# Patient Record
Sex: MALE | URBAN - METROPOLITAN AREA
[De-identification: names, ages, dates, MRNs, and addresses within clinical notes are randomized per-mention and may not be internally consistent; named-entity substitution may affect disease eponyms.]

---

## 2017-12-07 ENCOUNTER — COMMUNICATION - HEALTHEAST (OUTPATIENT)
Dept: SCHEDULING | Facility: CLINIC | Age: 22
End: 2017-12-07

## 2017-12-07 ENCOUNTER — OFFICE VISIT - HEALTHEAST (OUTPATIENT)
Dept: FAMILY MEDICINE | Facility: CLINIC | Age: 22
End: 2017-12-07

## 2017-12-07 DIAGNOSIS — F90.9 ADHD: ICD-10-CM

## 2017-12-07 DIAGNOSIS — R13.10 DYSPHAGIA: ICD-10-CM

## 2017-12-07 DIAGNOSIS — J45.40 MODERATE PERSISTENT ASTHMA: ICD-10-CM

## 2017-12-07 DIAGNOSIS — F32.A DEPRESSION: ICD-10-CM

## 2017-12-07 DIAGNOSIS — K21.00 GERD WITH ESOPHAGITIS: ICD-10-CM

## 2017-12-07 RX ORDER — ALBUTEROL SULFATE 0.83 MG/ML
2.5 SOLUTION RESPIRATORY (INHALATION) 3 TIMES DAILY
Status: SHIPPED | COMMUNITY
Start: 2017-12-07

## 2017-12-07 RX ORDER — LANSOPRAZOLE 30 MG/1
30 CAPSULE, DELAYED RELEASE ORAL DAILY
Status: SHIPPED | COMMUNITY
Start: 2017-12-07

## 2017-12-07 RX ORDER — ALBUTEROL SULFATE 5 MG/ML
2.5 SOLUTION RESPIRATORY (INHALATION) 3 TIMES DAILY
Status: SHIPPED | COMMUNITY
Start: 2017-12-07

## 2017-12-07 RX ORDER — ALBUTEROL SULFATE 90 UG/1
2 AEROSOL, METERED RESPIRATORY (INHALATION) EVERY 6 HOURS PRN
Status: SHIPPED | COMMUNITY
Start: 2017-12-07

## 2017-12-07 ASSESSMENT — MIFFLIN-ST. JEOR: SCORE: 1632.45

## 2017-12-08 ENCOUNTER — COMMUNICATION - HEALTHEAST (OUTPATIENT)
Dept: FAMILY MEDICINE | Facility: CLINIC | Age: 22
End: 2017-12-08

## 2017-12-08 ENCOUNTER — AMBULATORY - HEALTHEAST (OUTPATIENT)
Dept: FAMILY MEDICINE | Facility: CLINIC | Age: 22
End: 2017-12-08

## 2017-12-08 RX ORDER — BUDESONIDE 1 MG/2ML
INHALANT ORAL
Qty: 120 ML | Refills: 3 | Status: SHIPPED | OUTPATIENT
Start: 2017-12-08

## 2017-12-20 ENCOUNTER — COMMUNICATION - HEALTHEAST (OUTPATIENT)
Dept: FAMILY MEDICINE | Facility: CLINIC | Age: 22
End: 2017-12-20

## 2017-12-20 DIAGNOSIS — K21.00 GERD WITH ESOPHAGITIS: ICD-10-CM

## 2017-12-20 RX ORDER — OMEPRAZOLE
20 KIT
Qty: 450 ML | Refills: 0 | Status: SHIPPED | OUTPATIENT
Start: 2017-12-20

## 2017-12-22 ENCOUNTER — COMMUNICATION - HEALTHEAST (OUTPATIENT)
Dept: FAMILY MEDICINE | Facility: CLINIC | Age: 22
End: 2017-12-22

## 2017-12-22 ENCOUNTER — AMBULATORY - HEALTHEAST (OUTPATIENT)
Dept: FAMILY MEDICINE | Facility: CLINIC | Age: 22
End: 2017-12-22

## 2017-12-22 DIAGNOSIS — K21.00 GERD WITH ESOPHAGITIS: ICD-10-CM

## 2017-12-22 RX ORDER — SUCRALFATE 1 G/1
1 TABLET ORAL 4 TIMES DAILY
Qty: 120 TABLET | Refills: 1 | Status: SHIPPED | OUTPATIENT
Start: 2017-12-22

## 2021-05-31 VITALS — BODY MASS INDEX: 25.32 KG/M2 | WEIGHT: 157.56 LBS | HEIGHT: 66 IN

## 2021-06-14 NOTE — PROGRESS NOTES
ASSESSMENT and plan    1. GERD with esophagitis epigastric discomfort is noted today on examination.  He has difficulty swallowing capsules omeprazole.  He frequently has a burning sensation is worse in the morning and his family members comment on his halitosis.  Starting him on liquid preparations ranitidine and omeprazole to be taking both twice daily follow-up recommended in 6 weeks.  Reviewed lab results and his been treated for H. pylori in the recent past    2. Moderate persistent asthma family mentions he is wheezing difficulty breathing apparently has rescue inhaler in the form of albuterol would be diminished and was given a prescription for now and he had relief of his symptoms he has a neb machine that works at home I am going to put him on a controller ointment twice a day and see if this helps with his symptoms.  Side effects discussed with family    3. ADHD currently diagnosed with ADHD not on any medication for the denies any symptoms denies being hyperactive states that he is taking the medications faithfully    4. Depression and Zoloft.  He is not suicidal he denies thoughts of self-harm.  All the been no reviewed    5. Dysphagia persistent dysphagia present when he swallows a capsule he feels that it is not being digested and he will bring it up.  He feels like his Concerta opens appetites decreased weight loss of more than 20 pounds noted over the last 7-1/2 months.  He is seen [facility] has had endoscopy which was inconclusive.        .    CHIEF COMPLAINT:  Chief Complaint   Patient presents with     New patient     Emesis     Has a hard time keeping food down, has 2 vomiting yesterday and 1 vomiting today.       HISTORY OF PRESENT ILLNESS:  Isaak is a 22 y.o. male presenting to the clinic today to establish care. Isaak is present with mother, brother and uncle.     Abdominal Pain: For the past 5 years, he has been experiencing intermittent vomiting and difficulty  swallowing. Brother states that these symptoms began after a dental procedure. He has been seen by many providers at the [facility] for this issue.He had an upper endoscopy done on 12/29/2015 which was unremarkable. He also had a esophagogram done on 10/16/2017 which unremarkable.  Currently, he notes that food gets stuck in his throat.  He only drinks only flavored water and eats only broth based soups or foods that have been mashed to baby food. If he drinks milk, he will not eat for about 2 days.  He denies difficulty speaking. Of note, he has taken omeprazole in the past. Family notes that when he takes omeprazole, he will not be able to eat for several days.     Asthma: He notes intermittent shortness of breath with his albuterol nebulizer. Uncle states that he wakes up almost every night. Mother is inquiring about being switched to Pulmicort.      Diabetes: His fasting blood sugars range from .     REVIEW OF SYSTEMS:   He denies urinary concerns.   All other 10 point review of systems are negative.    PFSH:  Pertinent past, family, social and medical history reviewed.   History   Smoking Status     Never Smoker   Smokeless Tobacco     Never Used       No family history on file.    Social History     Social History     Marital status: Single     Spouse name: N/A     Number of children: N/A     Years of education: N/A     Occupational History     Not on file.     Social History Main Topics     Smoking status: Never Smoker     Smokeless tobacco: Never Used     Alcohol use No     Drug use: No     Sexual activity: Not on file     Other Topics Concern     Not on file     Social History Narrative       No past surgical history on file.    Allergies   Allergen Reactions     Ibuprofen Rash       Active Ambulatory Problems     Diagnosis Date Noted     No Active Ambulatory Problems     Resolved Ambulatory Problems     Diagnosis Date Noted     No Resolved Ambulatory Problems     Past Medical  "History:   Diagnosis Date     ADHD (attention deficit hyperactivity disorder)      Anxiety      Depression        VITALS:  Vitals:    12/07/17 1437 12/07/17 1440   BP: 120/90 124/90   Patient Site: Left Arm Left Arm   Patient Position: Sitting Sitting   Cuff Size: Adult Regular Adult Regular   Pulse: 79    Temp: 97.8  F (36.6  C)    TempSrc: Oral    SpO2: 98%    Weight: 157 lb 9 oz (71.5 kg)    Height: 5' 6\" (1.676 m)      Wt Readings from Last 3 Encounters:   12/07/17 157 lb 9 oz (71.5 kg)   03/05/15 193 lb (87.5 kg)     Body mass index is 25.43 kg/(m^2).    PHYSICAL EXAM:  General: Alert, cooperative, no distress, appears stated age  Lungs: Clear to auscultation bilaterally, respirations unlabored  Chest wall: No tenderness or deformity  Heart: Regular rate and rhythm, S1 and S2 normal, no murmur, rub, or gallop  CVS: No edema noted.   Abdomen: Soft, non tender, bowel sounds active all four quadrants, no masses, no organomegaly.  Neurologic: No tremor, no focal findings.     Psych: Oriented x3. Affect normal.     ADDITIONAL HISTORY SUMMARIZED (2): Reviewed Dr. Watson's note from 9/3/2017 regarding emesis. Reviewed endoscopy note from 12/29/2015.   DECISION TO OBTAIN EXTRA INFORMATION (1): Accessed Care Everywhere.   RADIOLOGY TESTS (1): Reviewed Esophagogram from 10/16/2017.  LABS (1): Labs reviewed from 9/14/2017.   MEDICINE TESTS (1): None.  INDEPENDENT REVIEW (2 each): None.     The visit lasted a total of 35 minutes face to face with the patient. Over 50% of the time was spent counseling and educating the patient about abdominal pain.     Eve CASEY, am scribing for and in the presence of, Dr. Guadalupe.    leandro CASEY, personally performed the services described in this documentation, as scribed by Eve Spence in my presence, and it is both accurate and complete.    MEDICATIONS:  Current Outpatient Prescriptions   Medication Sig Dispense Refill     albuterol (PROAIR HFA;PROVENTIL HFA;VENTOLIN " HFA) 90 mcg/actuation inhaler Inhale 2 puffs every 6 (six) hours as needed for wheezing.       albuterol (PROVENTIL) 2.5 mg /3 mL (0.083 %) nebulizer solution Take 2.5 mg by nebulization 3 (three) times a day.       albuterol 2.5 mg/0.5 mL Nebu nebulizer solution Take 2.5 mg by nebulization 3 (three) times a day.       lansoprazole (PREVACID) 30 MG capsule Take 30 mg by mouth daily.       propranolol (INDERAL) 20 MG tablet Take 20 mg by mouth 2 (two) times a day. For anxiety/elevated blood pressure       sertraline (ZOLOFT) 100 MG tablet Take 100 mg by mouth daily.       traZODone (DESYREL) 100 MG tablet Take 100 mg by mouth bedtime as needed for sleep.       No current facility-administered medications for this visit.        Total data points:5

## 2021-06-16 PROBLEM — F32.A DEPRESSION: Status: ACTIVE | Noted: 2017-12-07

## 2021-06-16 PROBLEM — R13.10 DYSPHAGIA: Status: ACTIVE | Noted: 2017-12-07

## 2021-06-16 PROBLEM — J45.40 MODERATE PERSISTENT ASTHMA: Status: ACTIVE | Noted: 2017-12-07
